# Patient Record
Sex: MALE | Race: BLACK OR AFRICAN AMERICAN | NOT HISPANIC OR LATINO | URBAN - NONMETROPOLITAN AREA
[De-identification: names, ages, dates, MRNs, and addresses within clinical notes are randomized per-mention and may not be internally consistent; named-entity substitution may affect disease eponyms.]

---

## 2020-01-08 ENCOUNTER — IMPORTED ENCOUNTER (OUTPATIENT)
Dept: URBAN - NONMETROPOLITAN AREA CLINIC 1 | Facility: CLINIC | Age: 50
End: 2020-01-08

## 2020-01-08 PROBLEM — H52.223: Noted: 2020-01-08

## 2020-01-08 PROBLEM — E11.9: Noted: 2020-01-08

## 2020-01-08 PROBLEM — H25.13: Noted: 2020-01-08

## 2020-01-08 PROBLEM — H52.13: Noted: 2020-01-08

## 2020-01-08 PROCEDURE — S0620 ROUTINE OPHTHALMOLOGICAL EXA: HCPCS

## 2020-01-08 NOTE — PATIENT DISCUSSION
Myopia/Astigmatism OUDiscussed refractive status with patient. New glasses Rx given today. Continue to monitor. [place] OUDiscussed diagnosis with patient. Reviewed symptoms related to cataract progression. Discussed various treatment options with patient. No treatment needed at this time. Continue to monitor. IDDM sans Retinopathy OUDiscussed diagnosis with patient. Discussed the risk of diabetic damage of the retina with potential vision loss and the importance of routine follow-up. Emphasized strict blood sugar control. Continue to monitor.

## 2022-04-09 ASSESSMENT — TONOMETRY
OS_IOP_MMHG: 19
OD_IOP_MMHG: 18

## 2022-04-09 ASSESSMENT — VISUAL ACUITY
OS_CC: 20/40
OD_PH: 20/25
OS_PH: 20/30
OD_CC: 20/40